# Patient Record
Sex: MALE | Race: WHITE | URBAN - METROPOLITAN AREA
[De-identification: names, ages, dates, MRNs, and addresses within clinical notes are randomized per-mention and may not be internally consistent; named-entity substitution may affect disease eponyms.]

---

## 2017-01-02 ENCOUNTER — HOSPITAL ENCOUNTER (OUTPATIENT)
Dept: BEHAVIORAL HEALTH | Facility: CLINIC | Age: 37
End: 2017-01-02
Attending: SOCIAL WORKER
Payer: COMMERCIAL

## 2017-01-02 PROCEDURE — H2035 A/D TX PROGRAM, PER HOUR: HCPCS | Mod: HQ

## 2017-01-03 ENCOUNTER — HOSPITAL ENCOUNTER (OUTPATIENT)
Dept: BEHAVIORAL HEALTH | Facility: CLINIC | Age: 37
End: 2017-01-03
Attending: SOCIAL WORKER
Payer: COMMERCIAL

## 2017-01-03 PROCEDURE — H2035 A/D TX PROGRAM, PER HOUR: HCPCS | Mod: HQ

## 2017-01-04 ENCOUNTER — HOSPITAL ENCOUNTER (OUTPATIENT)
Dept: BEHAVIORAL HEALTH | Facility: CLINIC | Age: 37
End: 2017-01-04
Attending: SOCIAL WORKER
Payer: COMMERCIAL

## 2017-01-04 PROCEDURE — H2035 A/D TX PROGRAM, PER HOUR: HCPCS | Mod: HQ

## 2017-01-05 NOTE — PROGRESS NOTES
Patient:  Brooks Dhaliwal            Adult CD Progress Note and Treatment Plan Review     Attendance  Monday = 2 hours of Phase 1 Group Therapy 01/02/2017 Tuesday = 2 hours of Phase 1 Group Therapy 01/03/2017 Wednesday = 2 hours of Phase 1 Group Therapy 01/04/2017   For a total of 10 sessions of Phase 1 completed to date.    Please refer to [facility] CD Adult Attendance Record Documentation Flowsheet    Support group attended this week: no    Reporting sobriety:  yes    Treatment Plan     Treatment Plan Review competed on: 01/05/2017      Client preferred learning style: Visual  Hands on    Staff Members contributing ALEJANDRO Busch  Received Supervision: No    Client: contributed to goals and plan.    Client received copy of plan/revised plan: Yes    Client agrees with plan/revised plan: Yes    Changes to Treatment Plan: No    New Goals added since last review: N/A    Goals worked on since last review: dim 5, dim 6, multiple assignments    Strategies effective: yes    Strategies need these changes: N/A    ASAM Risk Rating:    Dimension 1 0 Client reports last date of use of alcohol as 10/20/2016 and last date of use of marijuana as 12/10/2016. No current concerns.     Dimension 2 0 Client reports medical diagnosis. Client is able to obtain services and has a primary care provider. No current concerns.     Dimension 3 1 Client reports a mental health diagnosis of anxiety, depression and ADHD. Client reports he has a psychiatrist and therapist. Client reports he feels like his medication is working well (been on it for about 4 months now). Client appears stable. Client denies self harm. No other concerns at this time.     Dimension 4 1 Client reports his motivation for sobriety a 10 out of 10 (10 greatest). Client has external motivation through legals and relationship    Dimension 5 3 Client reports the following triggers: impatience and frustration. Client rates his cravings an 8 out of 10 (10 greatest). Client  "reports he is dealing with them by \"deep breathing\"    Dimension 6 2 Client is currently not working as he is seasonal. Client reports current pending legals (3rd DWI). Client denies sober support meetings.     Any changes in Vulnerable Adult Status?  No  If yes, add to treatment plan and individual abuse prevention plan.    Family Involvement:   scheduled family week 01/23 and 01/25    Data:   offered feedback client did actively participate   Client checked in with the group and confirmed his sobriety. Client rated his mental and physical health a 9 out of 10 (10) greatest. Client reports his move back into his house went well and is happy to be back home. Client reported some stress getting through the holiday as he spent it with his wife's family and celebrated [place], but was able to make it through. Client presented multiple assignments. Client reported his Usage History and stated he wasn't honest during the assessment but did the best he could to come up with a detailed history.     Intervention:   Behavior modification  Cognitive Behavioral Therapy  Counselor feedback  Education  Emotional management  Group feedback  Motivational Enhancement Therapy  Relapse prevention  Client presented multiple assignments.  Client participated in skills group - Ej torres group.   Client participated in a group activity about values.     Assessment:   Stages of Change Model  Contemplation    Appears/Sounds:  Cooperative  Motivated  Engaged  Client is engaged in group conversations, but is more reserved than some of the other group members. Client was honest about not being honest before, indicating internal motivation for change.      Plan:  Focus on recovery environment  Monitor emotional/physical health   Monitor anxiety/stress.  Present Cravings and Triggers next week 01/11/2016  Continue with individual therapist.    ALEJANDRO Busch        "

## 2017-01-09 ENCOUNTER — HOSPITAL ENCOUNTER (OUTPATIENT)
Dept: BEHAVIORAL HEALTH | Facility: CLINIC | Age: 37
End: 2017-01-09
Attending: SOCIAL WORKER
Payer: COMMERCIAL

## 2017-01-09 PROCEDURE — H2035 A/D TX PROGRAM, PER HOUR: HCPCS | Mod: HQ

## 2017-01-10 ENCOUNTER — HOSPITAL ENCOUNTER (OUTPATIENT)
Dept: BEHAVIORAL HEALTH | Facility: CLINIC | Age: 37
End: 2017-01-10
Attending: SOCIAL WORKER
Payer: COMMERCIAL

## 2017-01-10 PROCEDURE — H2035 A/D TX PROGRAM, PER HOUR: HCPCS | Mod: HQ

## 2017-01-11 ENCOUNTER — HOSPITAL ENCOUNTER (OUTPATIENT)
Dept: BEHAVIORAL HEALTH | Facility: CLINIC | Age: 37
End: 2017-01-11
Attending: SOCIAL WORKER
Payer: COMMERCIAL

## 2017-01-11 PROCEDURE — H2035 A/D TX PROGRAM, PER HOUR: HCPCS | Mod: HQ

## 2017-01-12 NOTE — PROGRESS NOTES
"Patient:  Brooks Dhaliwal            Adult CD Progress Note and Treatment Plan Review     Attendance  Monday = 2 hours of Phase 1 Group Therapy 01/09/2017 Tuesday = 2 hours of Phase 1 Group Therapy 01/10/2017  Wednesday = 2 hours of Phase 1 Group Therapy 01/11/2017   For a total of 13 sessions of Phase 1 completed to date.    Please refer to [facility] CD Adult Attendance Record Documentation Flowsheet    Support group attended this week: no    Reporting sobriety:  yes    Treatment Plan     Treatment Plan Review competed on: 01/12/2017      Client preferred learning style: Visual  Hands on    Staff Members contributing ALEJANDRO Busch  Received Supervision: No    Client: contributed to goals and plan.    Client received copy of plan/revised plan: Yes    Client agrees with plan/revised plan: Yes    Changes to Treatment Plan: No    New Goals added since last review: N/A    Goals worked on since last review: dim 5, dim 6, multiple assignments    Strategies effective: yes    Strategies need these changes: N/A    ASAM Risk Rating:    Dimension 1 0 Client reports last date of use of alcohol as 10/20/2016 and last date of use of marijuana as 12/10/2016. No current concerns.     Dimension 2 0 Client reports medical diagnosis. Client is able to obtain services and has a primary care provider. No current concerns.     Dimension 3 1 Client reports a mental health diagnosis of anxiety, depression and ADHD. Client reports he feels like his medication is working well (been on it for about 4 months now). Client appears stable. Client denies self harm. No other concerns at this time.     Dimension 4 1 Client reports his motivation for sobriety a 10 out of 10 (10 greatest). Client has external motivation through legals and relationship    Dimension 5 3 Client reports the following triggers: impatience and anxiety. Client rates his cravings an 8 out of 10 (10 greatest). Client reports he is dealing with them by \"deep breathing\"    " Dimension 6 2 Client is currently not working as he is seasonal. Client reports current pending legals (3rd DWI). Client denies sober support meetings.     Any changes in Vulnerable Adult Status?  No  If yes, add to treatment plan and individual abuse prevention plan.    Family Involvement:   scheduled family week 01/23 and 01/25    Data:   offered feedback client did actively participate   Client checked in with the group and confirmed his sobriety. Client rated his mental and physical health a 9 out of 10 (10) greatest. Client reports things are still going well at home. Client reports they are doing counseling together every other week. Client reports he spent the day snowboarding one day which he hasn't done in a long time. Client reports he didn't really do anything for fun when he was using. Client presented his What's Happening in My Early Recovery and Cravings and Triggers assignment. Client reported that he has a doctor and dentist apt coming up and he plans on telling his providers about his use.       Intervention:   Behavior modification  Cognitive Behavioral Therapy  Counselor feedback  Education  Emotional management  Group feedback  Motivational Enhancement Therapy  Relapse prevention  Client presented multiple assignments.  Client participated in skills group - Relapse Prevention.   Client participated in a group activity about stigma, shame and guilt.     Assessment:   Stages of Change Model  Contemplation    Appears/Sounds:  Cooperative  Motivated  Engaged  Client has opened up more this week. Client has been gaining insight in how his mental health played into his addiction. Client discussed still learning about his mental health because no one in his family ever talked about this growing up.      Plan:  Focus on recovery environment  Monitor emotional/physical health   Monitor anxiety/stress.  Present next assignment next week.  Continue with individual therapist.  Find meetings to attend/sober  support.    Oumou Ashraf [facility]

## 2017-01-16 ENCOUNTER — HOSPITAL ENCOUNTER (OUTPATIENT)
Dept: BEHAVIORAL HEALTH | Facility: CLINIC | Age: 37
End: 2017-01-16
Attending: SOCIAL WORKER
Payer: COMMERCIAL

## 2017-01-16 PROCEDURE — H2035 A/D TX PROGRAM, PER HOUR: HCPCS | Mod: HQ

## 2017-01-17 ENCOUNTER — HOSPITAL ENCOUNTER (OUTPATIENT)
Dept: BEHAVIORAL HEALTH | Facility: CLINIC | Age: 37
End: 2017-01-17
Attending: SOCIAL WORKER
Payer: COMMERCIAL

## 2017-01-17 PROCEDURE — H2035 A/D TX PROGRAM, PER HOUR: HCPCS | Mod: HQ

## 2017-01-18 ENCOUNTER — HOSPITAL ENCOUNTER (OUTPATIENT)
Dept: BEHAVIORAL HEALTH | Facility: CLINIC | Age: 37
End: 2017-01-18
Attending: SOCIAL WORKER
Payer: COMMERCIAL

## 2017-01-18 PROCEDURE — H2035 A/D TX PROGRAM, PER HOUR: HCPCS | Mod: HQ

## 2017-01-19 NOTE — PROGRESS NOTES
"Patient:  Brooks Dhaliwal            Adult CD Progress Note and Treatment Plan Review     Attendance Dates: 1/16/17, 1/17/17, 1/18/17    Total # of Group Sessions:  3        MONDAY TUESDAY WEDNESDAY THURSDAY FRIDAY SATURDAY SUNDAY  Total    Group Therapy   1  1  1             Specialty Groups*                    1:1                    Family Program                    [place]                      Phase II                     Absent                    Total   1  1  1          3                  For a total of 15 sessions of Phase 1 completed to date.    Please refer to [facility] CD Adult Attendance Record Documentation Flowsheet    Support group attended this week: no    Reporting sobriety:  yes    Treatment Plan     Treatment Plan Review competed on: 01/17/2017      Client preferred learning style: Visual  Hands on    Staff Members contributing Jocelyn Kehr Sparby, LADC  Received Supervision: No    Client: contributed to goals and plan.    Client received copy of plan/revised plan: Yes    Client agrees with plan/revised plan: Yes    Changes to Treatment Plan: No    New Goals added since last review: N/A    Goals worked on since last review: dim 5, dim 6    Strategies effective: yes    Strategies need these changes: N/A    ASAM Risk Rating:    Dimension 1 0 Client reports last date of use of alcohol as 10/20/2016 and last date of use of marijuana as 12/10/2016. No current concerns.     Dimension 2 0 Client reports medical diagnosis. Client is able to obtain services and has a primary care provider. No current concerns.     Dimension 3 1 Client reports a mental health diagnosis of anxiety, depression and ADHD. Client reports he feels like his medication is working well (been on it for about 4 months now).  Client denies any emotional symptoms.  He appears stable.    Dimension 4 1 Client continues to report his motivation for sobriety a 10 out of 10 (10 greatest).  Client identified reasons to stay sober as \"health, " "have better relationships, save money\".    Dimension 5 3 Client reports impatience is a trigger.  He reported experiencing cravings this week at an intensity of a 6 out of 10 (10=high). Client reports he is dealing with them with \"breathing techniques\".    Dimension 6 2 Client reports that budgeting is stressful for him.  He and his wife have planned a fishing trip in 2 weeks.    Any changes in Vulnerable Adult Status?  No  If yes, add to treatment plan and individual abuse prevention plan.    Family Involvement:   scheduled family week 01/23 and 01/25    Data:   offered feedback client did actively participate   Client checked in with the group and confirmed his sobriety. Client reported feeling a 9 out of 10.      Intervention:   Behavior modification  Cognitive Behavioral Therapy  Counselor feedback  Education  Emotional management  Group feedback  Relapse prevention     Client participated in identifying common cognitive distortions and their role in influencing negative emotions like stress and anxiety.  He practiced ways of alternative thinking.        Assessment:   Stages of Change Model  Contemplation    Appears/Sounds:  Cooperative  Motivated  Engaged        Plan:  Focus on recovery environment  Monitor emotional/physical health   Continue to monitor anxiety/stress and be mindful of thoughts and find ways to use alternative thinking to improve emotion.    Continue with individual therapist.  Client will attend family group with spouse next week    Jocelyn Kehr Sparby, LADC        "

## 2017-01-23 ENCOUNTER — HOSPITAL ENCOUNTER (OUTPATIENT)
Dept: BEHAVIORAL HEALTH | Facility: CLINIC | Age: 37
End: 2017-01-23
Attending: SOCIAL WORKER
Payer: COMMERCIAL

## 2017-01-23 PROCEDURE — H2035 A/D TX PROGRAM, PER HOUR: HCPCS | Mod: HQ

## 2017-01-24 ENCOUNTER — HOSPITAL ENCOUNTER (OUTPATIENT)
Dept: BEHAVIORAL HEALTH | Facility: CLINIC | Age: 37
End: 2017-01-24
Attending: SOCIAL WORKER
Payer: COMMERCIAL

## 2017-01-24 PROCEDURE — H2035 A/D TX PROGRAM, PER HOUR: HCPCS | Mod: HQ

## 2017-01-25 ENCOUNTER — HOSPITAL ENCOUNTER (OUTPATIENT)
Dept: BEHAVIORAL HEALTH | Facility: CLINIC | Age: 37
End: 2017-01-25
Attending: SOCIAL WORKER
Payer: COMMERCIAL

## 2017-01-25 PROCEDURE — H2035 A/D TX PROGRAM, PER HOUR: HCPCS | Mod: HQ

## 2017-01-26 NOTE — PROGRESS NOTES
"Patient:  Brooks Dhaliwal            Adult CD Progress Note and Treatment Plan Review     Attendance Dates: 01/23/2017, 01/24/2017, 01/25/2017    Total # of Group Sessions:  3        MONDAY TUESDAY WEDNESDAY THURSDAY FRIDAY SATURDAY SUNDAY  Total    Group Therapy   1    1             Specialty Groups*     1               1:1                    Family Program                    [place]                      Phase II                     Absent                    Total   1  1  1          3              For a total of 19 sessions of Phase 1 completed to date.    Please refer to [facility] CD Adult Attendance Record Documentation Flowsheet    Support group attended this week: no    Reporting sobriety:  yes    Treatment Plan     Treatment Plan Review competed on: 01/26/2017      Client preferred learning style: Visual  Hands on    Staff Members contributing ALEJANDRO Busch  Received Supervision: No    Client: contributed to goals and plan.    Client received copy of plan/revised plan: Yes    Client agrees with plan/revised plan: Yes    Changes to Treatment Plan: No    New Goals added since last review: N/A    Goals worked on since last review: dim 3, dim 5, dim 6    Strategies effective: yes    Strategies need these changes: N/A    ASAM Risk Rating:    Dimension 1 0 Client reports last date of use of alcohol as 10/20/2016 and last date of use of marijuana as 12/10/2016. No current concerns.     Dimension 2 0 Client reports medical diagnosis. Client is able to obtain services and has a primary care provider. No current concerns.     Dimension 3 1 Client reports a mental health diagnosis of anxiety, depression and ADHD. Client denies any emotional changes. Client denies self harm.  He appears stable.    Dimension 4 1 Client continues to report his motivation for sobriety a 10 out of 10 (10 greatest).  Client identified reasons to stay sober as \"healthy relationships, mental health, save money\".    Dimension 5 3 Client " "reports frustration and others as triggers.  He reported experiencing cravings this week at an intensity of a 6 out of 10 (10=high). Client reports he is dealing with them with \"breathing and staying busy\"    Dimension 6 2 Client reports that it's still an adjustment living at home. Reports they are still doing therapy. Client reports him and his wife are going ice fishing this weekend.     Any changes in Vulnerable Adult Status?  No  If yes, add to treatment plan and individual abuse prevention plan.    Family Involvement:   scheduled family week 01/23 and 01/25 - rescheduled     Data:   offered feedback client did actively participate   Client checked in with the group and confirmed his sobriety. Client reported feeling a 10 out of 10.  Client reports he got a project done around the house which he was happy about. Client discussed a stressful few days at home and having to take 3 dogs to the vet, which was a stressful situation. Client reported him and his wife are still going to counseling and discussing the issues.  Client discussed 5 ways his addiction impacted his mental health and took a lot of time and thought into it. Client described the 5 ways and gave examples of each. Client also shared what he took away from [employer].     Intervention:   Behavior modification  Cognitive Behavioral Therapy  Counselor feedback  Education  Emotional management  Group feedback  Relapse prevention     Client participated in a group activity about stages of change. We discussed how change works and how change doesn't come from just stopping using, but all the other work that goes into it. Client discussed his mental health and how that needed to change for him to be successful.     Assessment:   Stages of Change Model  Preparation/Determination    Appears/Sounds:  Cooperative  Motivated  Engaged  Client appears ready to move into phase 2 next week. Client appears genuine in wanting to change from old behaviors. "     Plan:  Focus on recovery environment  Monitor emotional/physical health   Continue to monitor anxiety/stress and be mindful of thoughts and find ways to use alternative thinking to improve emotion.  Continue with individual therapist.  Continue counseling with wife.  Begin Phase 2 next week 01/31/2017    ALEJANDRO Busch

## 2017-01-31 ENCOUNTER — HOSPITAL ENCOUNTER (OUTPATIENT)
Dept: BEHAVIORAL HEALTH | Facility: CLINIC | Age: 37
End: 2017-01-31
Attending: SOCIAL WORKER
Payer: COMMERCIAL

## 2017-01-31 PROCEDURE — H2035 A/D TX PROGRAM, PER HOUR: HCPCS | Mod: HQ

## 2017-02-02 NOTE — PROGRESS NOTES
"Patient:  Brooks Dhaliwal            Adult CD Progress Note and Treatment Plan Review     Attendance Dates: 01/31/2017    Total # of Group Sessions:  1        MONDAY TUESDAY WEDNESDAY THURSDAY FRIDAY SATURDAY SUNDAY  Total    Group Therapy                    Specialty Groups*                   1:1                    Family Program                    [place]                      Phase II      1               Absent                    Total     1                         Please refer to OP BE[facility] CD Adult Attendance Record Documentation Flowsheet    Support group attended this week: no    Reporting sobriety:  yes    Treatment Plan     Treatment Plan Review competed on: 02/01/2017      Client preferred learning style: Visual  Hands on    Staff Members contributing ALEJANDRO Busch  Received Supervision: No    Client: contributed to goals and plan.    Client received copy of plan/revised plan: Yes    Client agrees with plan/revised plan: Yes    Changes to Treatment Plan: No    New Goals added since last review: N/A    Goals worked on since last review: completed Phase 1, moved into Phase 2    Strategies effective: yes    Strategies need these changes: N/A    ASAM Risk Rating:    Dimension 1 0 Client reports last date of use of alcohol as 10/20/2016 and last date of use of marijuana as 12/10/2016. No change.     Dimension 2 0 Client reports medical diagnosis. Client is able to obtain services and has a primary care provider. No change.    Dimension 3 1 Client reports a mental health diagnosis of anxiety, depression and ADHD. Client denies any emotional changes. Client denies self harm.  He appears stable.    Dimension 4 1 Client continues to report his motivation for sobriety an 8 out of 10 (10 greatest).  Client identified reasons to stay sober as \"better relationships, health, self respect\".    Dimension 5 3 Client reports depression as trigger this week.  He reported experiencing cravings this week at an " "intensity of a 6 out of 10 (10 greatest). Client reports he is dealing with them with  \"staying busy\".    Dimension 6 2 Client reports that it's still an adjustment living at home. Reports they are still doing therapy. Client reports his fishing trip was more of a trigger than he thought it would be.     Any changes in Vulnerable Adult Status?  No  If yes, add to treatment plan and individual abuse prevention plan.    Family Involvement:   scheduled family week 01/23 and 01/25 - rescheduled     Data:   offered feedback client did actively participate   Client checked in with his new group and confirmed his sobriety. Client reported feeling a 9 out of 10 (10 greatest). Client reports he had a good fishing trip but realized it was more of a trigger than he anticipated. Client reports he went with his wife who is supportive of his sobriety. Client discussed his goals and Planning Aftercare and said he would succeed by continuing individual and couples therapy and continuing to take his medications.      Intervention:   Aftercare planning  Behavior modification  Cognitive Behavioral Therapy  Counselor feedback  Emotional management  Group feedback  Relapse prevention     Client received his Phase 2 assignments.  Client began Phase 2.  Client discussed his Planning Aftercare assignment with the group.      Assessment:   Stages of Change Model  Preparation/Determination    Appears/Sounds:  Cooperative  Motivated  Engaged  Client continues to use staying busy as a way to deal with triggers. Client would benefit from using more coping skills, like discussed in group. Client does continue to attend therapy and couples therapy so he is well aware of many coping skills.     Plan:  Focus on recovery environment  Monitor emotional/physical health   Continue to monitor anxiety/stress and be mindful of thoughts and find ways to use alternative thinking to improve emotion.  Continue with individual therapist.  Continue counseling " with wife.  Find hobbies/explore activities to do to keep busy but have fun.     Oumou Ashraf,LADC

## 2017-02-14 ENCOUNTER — HOSPITAL ENCOUNTER (OUTPATIENT)
Dept: BEHAVIORAL HEALTH | Facility: CLINIC | Age: 37
End: 2017-02-14
Attending: SOCIAL WORKER
Payer: COMMERCIAL

## 2017-02-14 PROCEDURE — H2035 A/D TX PROGRAM, PER HOUR: HCPCS | Mod: HQ

## 2017-02-15 NOTE — PROGRESS NOTES
"Patient:  Brooks Dhaliwal            Adult CD Progress Note and Treatment Plan Review     Attendance Dates: 02/14/2017    Total # of Group Sessions:  2        MONDAY TUESDAY WEDNESDAY THURSDAY FRIDAY SATURDAY SUNDAY  Total    Group Therapy                    Specialty Groups*                   1:1                    Family Program                    [place]                      Phase II      1               Absent                    Total     1                         Please refer to OP BE[facility] CD Adult Attendance Record Documentation Flowsheet    Support group attended this week: no    Reporting sobriety:  yes    Treatment Plan     Treatment Plan Review competed on: 02/15/2017      Client preferred learning style: Visual  Hands on    Staff Members contributing Oumou Ashraf, JUSTINOC; ELIZA Nickerson, Intern  Received Supervision: No    Client: contributed to goals and plan.    Client received copy of plan/revised plan: Yes    Client agrees with plan/revised plan: Yes    Changes to Treatment Plan: No    New Goals added since last review: N/A    Goals worked on since last review: dim 5 and dim 6    Strategies effective: yes    Strategies need these changes: N/A    ASAM Risk Rating:    Dimension 1 0 Client reports last date of use of alcohol as 10/20/2016 and last date of use of marijuana as 12/10/2016. No change.     Dimension 2 0 Client reports medical diagnosis. Client is able to obtain services and has a primary care provider. No change.    Dimension 3 1 Client reports no change in mental health status or diagnosis.  Client denies any emotional changes. Client denies self harm.  He appears stable.    Dimension 4 1 Client continues to report his motivation for sobriety an 8 out of 10 (10 greatest).  Client identified reasons to stay sober as \"better relationships, better quality of life, and ability to do job better.\" Client reports he has a court on Thursday 2/16/17 regarding 3rd DUI.    Dimension 5 3 Client reports " "frustration as trigger this week due to work related job travel to \"[facility], over the weekend\".  He reported experiencing cravings this week at an intensity of a 6 out of 10 (10 greatest). Client reports he is dealing with them by doing  \"deep breathing\" exercise.    Dimension 6 2 Client reports that it's still an adjustment to living at home. Reports they are still doing marital therapy. Client reports he hopes to find a balance in his long work hours, and using more Assertive communication on both the \"home and job front, to manage frustration\".  Client denies a sponsor at this time. No other concerns at this time.    Any changes in Vulnerable Adult Status?  No  If yes, add to treatment plan and individual abuse prevention plan.    Family Involvement:   scheduled family week 01/23 and 01/25 - rescheduled     Data:   offered feedback client did actively participate   Client checked in with the group and confirmed his sobriety. Client reported feeling a 8 out of 10 (10 greatest).  Client reports his wife  is supportive of his sobriety. Client discussed how his mental health has improved with sobriety. Client reported his medications are working better and is able to process his emotions. Client has discussed in the past his anxiety was out of control, which is a reason he was using substances.     Intervention:   Aftercare planning  Behavior modification  Cognitive Behavioral Therapy  Counselor feedback  Emotional management  Group feedback  Relapse prevention     Client worked on Page 6/7 on his RC Plan. Client discussed with the group being assertive and his mental health changes.     Assessment:   Stages of Change Model  Preparation/Determination    Appears/Sounds:  Cooperative  Motivated  Engaged     Client participated in group by discussing his way of communicating better.  Client continues to use \"staying busy\" as a way to deal with triggers. Client reports using reducing his stress by using \"deep breathing " "exercise, like discussed in group. \"  Client does continue to attend individual therapy and couples therapy so he is well aware of many coping skills.     Plan:  Focus on recovery environment  Monitor emotional/physical health   Continue to monitor anxiety/stress and be mindful of thoughts and find ways to use alternative thinking to improve emotion.  Continue with individual therapist.  Continue counseling with wife.  Find hobbies/explore activities to do to keep busy but have fun.     Oumou Ashraf, [facility] Nickerson, Intern      "

## 2017-02-21 ENCOUNTER — HOSPITAL ENCOUNTER (OUTPATIENT)
Dept: BEHAVIORAL HEALTH | Facility: CLINIC | Age: 37
End: 2017-02-21
Attending: SOCIAL WORKER
Payer: COMMERCIAL

## 2017-02-21 PROCEDURE — H2035 A/D TX PROGRAM, PER HOUR: HCPCS | Mod: HQ

## 2017-02-22 NOTE — PROGRESS NOTES
"Patient:  Brooks Dhaliwal            Adult CD Progress Note and Treatment Plan Review     Attendance Dates: 02/21/2017    Total # of Phase 2 Group Sessions:  2  Total # of Group sessions this week: 1 MONDAY TUESDAY WEDNESDAY THURSDAY FRIDAY SATURDAY SUNDAY  Total    Group Therapy                    Specialty Groups*                   1:1                    Family Program                    [place]                      Phase II      1               Absent                    Total     1                         Please refer to OP BEH CD Adult Attendance Record Documentation Flowsheet    Support group attended this week: no    Reporting sobriety:  yes    Treatment Plan     Treatment Plan Review competed on: 02/22/2017      Client preferred learning style: Visual  Hands on    Staff Members contributing ALEJANDRO Busch  Received Supervision: No    Client: contributed to goals and plan.    Client received copy of plan/revised plan: Yes    Client agrees with plan/revised plan: Yes    Changes to Treatment Plan: No    New Goals added since last review: N/A    Goals worked on since last review: dim 5 and dim 6, court    Strategies effective: yes    Strategies need these changes: N/A    ASAM Risk Rating:    Dimension 1 0 Client reports last date of use of alcohol as 10/20/2016 and last date of use of marijuana as 12/10/2016. No change.     Dimension 2 0 Client reports medical diagnosis. Client is able to obtain services and has a primary care provider. No change.    Dimension 3 1 Client reports no change in mental health status or diagnosis.  Client denies any emotional changes. Client denies self harm.  He appears stable.    Dimension 4 1 Client continues to report his motivation for sobriety an 8 out of 10 (10 greatest).  Client identified reasons to stay sober as \"better relationships, better quality of life, and ability to do job better.\"    Dimension 5 2 Client reports others as trigger this week.  He " "reported experiencing cravings this week at an intensity of a 7 out of 10 (10 greatest). Client reports he is dealing with them by doing  \"deep breathing\" exercise.    Dimension 6 2 Client reports court went better than he expected. Client reports being happy about that. Client reports he is starting to work a little more each week. Client discussed how he needs to delegate better and pass on tasks so he doesn't take on so much work. Client reported he needs to starts trusting others at work to get the job done.     Any changes in Vulnerable Adult Status?  No  If yes, add to treatment plan and individual abuse prevention plan.    Family Involvement:   scheduled family week 01/23 and 01/25 - rescheduled     Data:   offered feedback client did actively participate   Client checked in with the group and confirmed his sobriety. Client reported feeling a 8 out of 10 (10 greatest).  Client dicussed changes he needs to make to stop repeating mistakes of the past and present. Client reported delegating more at work. When has how he would do that, client reported trusting others to be responsible. Client discussed how his mental and physical health have become a lot better, and doesn't want to go backwards in his recovery.     Intervention:   Aftercare planning  Behavior modification  Cognitive Behavioral Therapy  Counselor feedback  Emotional management  Group feedback  Relapse prevention     Client worked on 5 things he needs to change when leaving treatment.   Client gave encouraging feedback to another group member completing treatment.      Assessment:   Stages of Change Model  Preparation/Determination    Appears/Sounds:  Cooperative  Motivated  Engaged     Client appears he is able to see how sobriety has improved his mental and physical health. Client appears to want to make these changes for himself, as he is continuing with individual and marriage counseling.     Plan:  Focus on recovery environment  Monitor " emotional/physical health   Continue to monitor anxiety/stress and be mindful of thoughts and find ways to use alternative thinking to improve emotion.  Continue with individual therapist.  Continue counseling with wife.  Find hobbies/explore activities to do to keep busy but have fun.   Delegate responsibilities at work.     Oumou Ashraf, ALEJANDRO

## 2017-02-28 ENCOUNTER — HOSPITAL ENCOUNTER (OUTPATIENT)
Dept: BEHAVIORAL HEALTH | Facility: CLINIC | Age: 37
End: 2017-02-28
Attending: SOCIAL WORKER
Payer: COMMERCIAL

## 2017-03-01 NOTE — PROGRESS NOTES
"Patient:  Brooks Dhaliwal            Adult CD Progress Note and Treatment Plan Review     Attendance Dates: 02/28/2017    Total # of Phase 2 Group Sessions:  4  Total # of Group sessions this week: 1 MONDAY TUESDAY WEDNESDAY THURSDAY FRIDAY SATURDAY SUNDAY  Total    Group Therapy                    Specialty Groups*                   1:1                    Family Program                    [place]                      Phase II      1               Absent                    Total     1                         Please refer to OP BE[facility] CD Adult Attendance Record Documentation Flowsheet    Support group attended this week: no    Reporting sobriety:  yes    Treatment Plan     Treatment Plan Review competed on: 03/01/2017      Client preferred learning style: Visual  Hands on    Staff Members contributing ALEJANDRO Busch Intern  Received Supervision: No    Client: contributed to goals and plan.    Client received copy of plan/revised plan: Yes    Client agrees with plan/revised plan: Yes    Changes to Treatment Plan: No    New Goals added since last review: N/A    Goals worked on since last review: dim 5 and dim 6, work    Strategies effective: yes    Strategies need these changes: N/A    ASAM Risk Rating:    Dimension 1 0 Client reports last date of use of alcohol as 10/20/2016 and last date of use of marijuana as 12/10/2016. No change.     Dimension 2 0 Client reports no current medical diagnosis. No current concerns.     Dimension 3 1 Client reports no change in mental health status or diagnosis.  Client denies any emotional changes. Client denies self harm.  He appears stable.    Dimension 4 1 Client continues to report his motivation for sobriety an 8 out of 10 (10 greatest).  Client identified reasons to stay sober as \"better relationships, health, and being able to get more accomplished.\" Client appears in the preparation stage of change, by addressing his mental health and reasons for " "sobriety.    Dimension 5 2 Client reports frustration and impatience as trigger this week.  He reported experiencing cravings this week at an intensity of a 6 out of 10 (10 greatest). Client reports he is dealing with them by doing  \"deep breathing exercise, and staying busy.\" Client reports he is busy getting ready to return to work.     Dimension 6 2 Client reports he has been under stress about work over the last week, as he will be starting back working. Client discussed how he needs to delegate better and pass on tasks so he doesn't take on so much work. Client reported he needs to starts trusting others at work to get the job done.     Any changes in Vulnerable Adult Status?  No  If yes, add to treatment plan and individual abuse prevention plan.    Family Involvement:   scheduled family week 01/23 and 01/25 - rescheduled     Data:   offered feedback client did actively participate   Client checked in with the group and confirmed his sobriety. Client reported feeling a 8 out of 10 (10 greatest). Client participated in working on his Recovery Care packet with the group and discussed being aware of warning triggers and developing coping skills. Client shared with the group that he went to a party for his friend birthday and that no one even realized he wasn't drinking. Client reported that  he actually felt good when it was time to leave, as he was able to drive himself home.      Intervention:   Aftercare planning  Behavior modification  Cognitive Behavioral Therapy  Counselor feedback  Emotional management  Group feedback  Relapse prevention   Client worked on addressing what coping skills he uses for his mental health and addiction issues.  Client gave encouraging feedback to another group member completing treatment.      Assessment:   Stages of Change Model  Preparation/Determination    Appears/Sounds:  Cooperative  Motivated  Engaged     Client appears he is able to see how sobriety has improved his mental " and physical health. Client appears to want to make these changes for himself, as he is continuing with individual and marriage counseling. Client is more reserved during group, but has seemed to connect with a few of the group members.     Plan:  Focus on recovery environment  Monitor emotional/physical health   Continue to monitor anxiety/stress and be mindful of thoughts and find ways to use alternative thinking to improve emotion.  Continue with individual therapist.  Continue counseling with wife.  Find hobbies/explore activities to do to keep busy but have fun.   Delegate responsibilities at work.     Oumou Ashraf, ALEJANDRO Nickerson, Intern

## 2017-03-07 ENCOUNTER — HOSPITAL ENCOUNTER (OUTPATIENT)
Dept: BEHAVIORAL HEALTH | Facility: CLINIC | Age: 37
End: 2017-03-07
Attending: SOCIAL WORKER
Payer: COMMERCIAL

## 2017-03-07 PROCEDURE — H2035 A/D TX PROGRAM, PER HOUR: HCPCS | Mod: HQ

## 2017-03-08 NOTE — PROGRESS NOTES
"Patient:  Brooks Dhaliwal            Adult CD Progress Note and Treatment Plan Review     Attendance Dates: 03/7/2017    Total # of Phase 2 Group Sessions:  5  Total # of Group sessions this week: 1 MONDAY TUESDAY WEDNESDAY THURSDAY FRIDAY SATURDAY SUNDAY  Total    Group Therapy                    Specialty Groups*                   1:1                    Family Program                    [place]                      Phase II      1               Absent                    Total     1                         Please refer to OP BEH CD Adult Attendance Record Documentation Flowsheet    Support group attended this week: no    Reporting sobriety:  yes    Treatment Plan     Treatment Plan Review competed on: 03/08/2017      Client preferred learning style: Visual  Hands on    Staff Members contributing ALEJANDRO Busch Intern  Received Supervision: No    Client: contributed to goals and plan.    Client received copy of plan/revised plan: Yes    Client agrees with plan/revised plan: Yes    Changes to Treatment Plan: No    New Goals added since last review: N/A    Goals worked on since last review: dim 5 and dim 6    Strategies effective: yes    Strategies need these changes: N/A    ASAM Risk Rating:    Dimension 1 0 Client reports last date of use of alcohol as 10/20/2016 and last date of use of marijuana as 12/10/2016. No change.      Dimension 2 0 Client reports no current medical diagnosis. No current concerns.     Dimension 3 1 Client reports no change in mental health status or diagnosis.  Client denies any emotional changes. Client denies self harm.  He appears stable.    Dimension 4 1 Client continues to report his motivation for sobriety an 9 out of 10 (10 greatest).  Client identified reasons to stay sober as \"mental health, staying out of trouble, better relationships\". Client appears in the preparation stage of change, by addressing his mental health and reasons for sobriety.    " "Dimension 5 2 Client reports others as trigger this week.  He reported experiencing cravings this week at an intensity of a 5 out of 10 (10 greatest). Client reports he is dealing with them by doing  \"deep breathing exercise, and staying busy.\" Client reports his license has been revoked for 1 year, and that he is currently waiting to have interlock placed in his vehicle.     Dimension 6 2 Client reports he has been under stress at work, and also while trying to sell his home. Client reports he and his wife are planning a last minute trip to [place] over the weekend, to celebrate their anniversary. Client denies sober support meetings. Client denies a sponsor.     Any changes in Vulnerable Adult Status?  No  If yes, add to treatment plan and individual abuse prevention plan.    Family Involvement:   scheduled family week 01/23 and 01/25 - rescheduled     Data:   offered feedback client did actively participate   Client checked in with the group and confirmed his sobriety. Client reported feeling an 8 out of 10 (10 greatest). Client participated in discussion with the group on ways to support sobriety, recognizing warning triggers and developing coping skills. Client connected with other group members discussing mental health, anxiety symptoms. Client also provided feedback about housing to another client.     Intervention:   Aftercare planning  Counselor feedback  Emotional management  Group feedback  Relapse prevention     Client worked on addressing what coping skills he uses for his mental health and addiction issues.    Assessment:   Stages of Change Model  Preparation/Determination    Appears/Sounds:  Cooperative  Motivated  Engaged     Client appears he is able to see how sobriety has improved his mental and physical health. Client appears to want to make these changes for himself, as he is continuing with individual and marriage counseling. Client's legal issues have been resolved and is taking the next steps " needed to get his licence back, which is positive.     Plan:  Focus on recovery environment  Monitor emotional/physical health   Continue to monitor anxiety/stress and be mindful of thoughts and find ways to use alternative thinking to improve emotion.  Continue with individual therapist.  Continue counseling with wife.  Delegate responsibilities at work.     ALEJANDRO Busch, Intern

## 2017-03-14 ENCOUNTER — HOSPITAL ENCOUNTER (OUTPATIENT)
Dept: BEHAVIORAL HEALTH | Facility: CLINIC | Age: 37
End: 2017-03-14
Attending: SOCIAL WORKER
Payer: COMMERCIAL

## 2017-03-14 PROCEDURE — H2035 A/D TX PROGRAM, PER HOUR: HCPCS | Mod: HQ

## 2017-03-15 NOTE — PROGRESS NOTES
"Patient:  Brooks Dhaliwal            Adult CD Progress Note and Treatment Plan Review     Attendance Dates: 03/14/2017    Total # of Phase 2 Group Sessions:  6  Total # of Group sessions this week: 1 MONDAY TUESDAY WEDNESDAY THURSDAY FRIDAY SATURDAY SUNDAY  Total    Group Therapy                    Specialty Groups*                   1:1                    Family Program                    [place]                      Phase II      1               Absent                    Total     1                         Please refer to OP BEH CD Adult Attendance Record Documentation Flowsheet    Support group attended this week: no    Reporting sobriety:  yes    Treatment Plan     Treatment Plan Review competed on: 03/15/2017      Client preferred learning style: Visual  Hands on    Staff Members contributing ALEJANDRO Busch ;  ELIZA Nickerson, Intern  Received Supervision: No    Client: contributed to goals and plan.    Client received copy of plan/revised plan: Yes    Client agrees with plan/revised plan: Yes    Changes to Treatment Plan: No    New Goals added since last review: N/A    Goals worked on since last review: vacation, getting DL back    Strategies effective: yes    Strategies need these changes: N/A    ASAM Risk Rating:    Dimension 1 0 Client reports last date of use of alcohol as 10/20/2016 and last date of use of marijuana as 12/10/2016. No change.      Dimension 2 0 Client reports no current medical diagnosis. No current concerns.     Dimension 3 1 Client reports no change in mental health status or diagnosis.  Client denies any emotional changes. Client denies self harm.  He appears stable.    Dimension 4 1 Client continues to report his motivation for sobriety an 9 out of 10 (10 greatest).  Client identified reasons to stay sober as \"mental health, staying out of trouble, to better deal with problems\". Client appears in the preparation stage of change, by addressing his mental health and reasons " "for sobriety.    Dimension 5 2 Client reports frustration and others as triggers this week.  He reported experiencing cravings this week at an intensity of a 5 out of 10 (10 greatest). Client reports he is dealing with them by doing  \"deep breathing exercise, and staying busy.\" Client states he has been more active in maintaining his health, self respect, and dignity.   Dimension 6 2  Client reports he and his wife had a fun vacation in [place] over the weekend, celebrating their anniversary. Client denies sober support meetings. Client denies a sponsor. Client reports his license has been revoked for 1 year, and that he is currently waiting to have interlock placed in his vehicle. Client states he wants to start driving again, and get his license back.    Any changes in Vulnerable Adult Status?  No  If yes, add to treatment plan and individual abuse prevention plan.    Family Involvement:   scheduled family week 01/23 and 01/25 - rescheduled     Data:   offered feedback client did actively participate  Client checked in with group feeling 9 of 10 (10 greatest) and confirmed his sobriety . Client participated in group Personal Recovery Care Plan packet assignment, Crisis Management. Client shared with group he will addresses crisis', by remembering how life is much worse when using and talking with friends depending on what's going on. Client states he is \"normally calm, and manges things pretty well\".     Intervention:   Aftercare planning  Counselor feedback  Emotional management  Group feedback  Relapse prevention   Client worked on Recovery Care Packet with the group.     Assessment:   Stages of Change Model  Preparation/Determination    Appears/Sounds:  Cooperative  Motivated  Engaged     Client appears to see how sobriety has improved his mental and physical health. Client appears to want to make these changes for himself, as he is continuing with individual and marriage counseling. Client's legal issues have been " resolved and is taking the next steps needed to get his licence back, which is positive.     Plan:  Focus on recovery environment  Monitor emotional/physical health   Continue to monitor anxiety/stress and be mindful of thoughts and find ways to use alternative thinking to improve emotion.  Continue with individual therapist.  Continue counseling with wife.  Delegate responsibilities at work.     ALEJANDRO Busch, Intern

## 2017-03-21 ENCOUNTER — TELEPHONE (OUTPATIENT)
Dept: BEHAVIORAL HEALTH | Facility: CLINIC | Age: 37
End: 2017-03-21

## 2017-03-21 NOTE — TELEPHONE ENCOUNTER
3/21/2017    Client left a VM on 03/20/2017 at 8:37pm stating he won't be in group on 03/21/2017 due to needing to attend his wife's family members wake.     Oumou Ashraf, JUSTINOC

## 2017-03-28 ENCOUNTER — HOSPITAL ENCOUNTER (OUTPATIENT)
Dept: BEHAVIORAL HEALTH | Facility: CLINIC | Age: 37
End: 2017-03-28
Attending: SOCIAL WORKER
Payer: COMMERCIAL

## 2017-03-28 PROCEDURE — H2035 A/D TX PROGRAM, PER HOUR: HCPCS | Mod: HQ

## 2017-03-29 NOTE — PROGRESS NOTES
"Patient:  Brooks Dhaliwal            Adult CD Progress Note and Treatment Plan Review     Attendance Dates: 03/28/2017    Total # of Phase 2 Group Sessions:  7  Total # of Group sessions this week: 1 MONDAY TUESDAY WEDNESDAY THURSDAY FRIDAY SATURDAY SUNDAY  Total    Group Therapy                    Specialty Groups*                   1:1                    Family Program                    [place]                      Phase II      1               Absent                    Total     1                         Please refer to OP [facility] CD Adult Attendance Record Documentation Flowsheet    Support group attended this week: no    Reporting sobriety:  yes    Treatment Plan     Treatment Plan Review competed on: 03/29/2017      Client preferred learning style: Visual  Hands on    Staff Members contributing ALEJANDRO Busch;  ELIZA Nickerson, Intern  Received Supervision: No    Client: contributed to goals and plan.    Client received copy of plan/revised plan: Yes    Client agrees with plan/revised plan: Yes    Changes to Treatment Plan: No    New Goals added since last review: N/A    Goals worked on since last review: went back to work    Strategies effective: yes    Strategies need these changes: N/A    ASAM Risk Rating:    Dimension 1 0 Client reports last date of use of alcohol as 10/20/2016 and last date of use of marijuana as 12/10/2016. Client reports no withdrawal symptoms at this time. No change.        Dimension 2 0 Client reports no current medical diagnosis. No current concerns.       Dimension 3 1 Client reports no change in mental health status or diagnosis.  Client reports feeling of anxiety and rating at 5 out of 10 (highest). Client reports he was able to cope with feeling of anxiety by \"deep breathing\". Client states he is feeling tired and that he is working on get sleeping schedule on track for new early morning hours at work.  Client denies self harm.  He appears stable.      Dimension 4 " "Client identified values he focused on for moving his life forward in the past week were honesty and work ethic.  Client continues to appear in the preparation stage of change, by addressing his mental health and reasons for sobriety.      Dimension 5 2 Client reports starting work for the season as triggers this week.  He reported experiencing cravings this week at an intensity of 4 out of 10 (10 greatest). Client reports starting work for the season caused triggers, and that he \"stayed calm and focused.\"       Dimension 6 2  Client rated his living situation a 2 out of 10 (very stressful).  Client reports his \"wife is very supportive, but also can cause stress\". Client reports he will build sober networks in the coming week by \"telling people that I am sober, as I start seeing people at work.\"  Client denies sober support meetings. Client denies a sponsor.     Any changes in Vulnerable Adult Status?  No  If yes, add to treatment plan and individual abuse prevention plan.    Family Involvement:   scheduled family week 01/23 and 01/25 - rescheduled     Data:   offered feedback client did actively participate     Client checked in with group feeling 9 of 10 (10 greatest) and confirmed his sobriety . Client participated in group Personal Recovery Care Plan packet, and discussed values and how substance use interfered with his ability to live a Value directed life. Client shared with group, values that are important to him is \"integrity\".       Intervention:   Aftercare planning  Counselor feedback  Emotional management  Group feedback  Relapse prevention   Client worked on Recovery Care Packet with the group.     Assessment:   Stages of Change Model  Preparation/Determination    Appears/Sounds:  Cooperative  Motivated  Engaged     Client appears to see how sobriety has improved his mental and physical health. Client appears to want to make these changes for himself, as he is continuing with individual and marriage " counseling.     Plan:  Focus on recovery environment  Monitor emotional/physical health   Continue to monitor anxiety/stress and be mindful of thoughts and find ways to use alternative thinking to improve emotion.   Continue with individual therapist.  Continue counseling with wife.  Monitor self care with returning to work  Work on RCP for next week.    Oumou Ashraf, ALEJANDRO Nickerson, Intern

## 2017-04-04 ENCOUNTER — HOSPITAL ENCOUNTER (OUTPATIENT)
Dept: BEHAVIORAL HEALTH | Facility: CLINIC | Age: 37
End: 2017-04-04
Attending: SOCIAL WORKER
Payer: COMMERCIAL

## 2017-04-04 PROCEDURE — H2035 A/D TX PROGRAM, PER HOUR: HCPCS | Mod: HQ

## 2017-04-05 NOTE — PROGRESS NOTES
"Patient:  Brooks Dhaliwal            Adult CD Progress Note and Treatment Plan Review     Attendance Dates: 04/04/2017    Total # of Phase 2 Group Sessions:  8  Total # of Group sessions this week: 1        MONDAY TUESDAY WEDNESDAY THURSDAY FRIDAY SATURDAY SUNDAY  Total    Group Therapy                    Specialty Groups*                   1:1                    Family Program                    [place]                      Phase II      1.5            1.5   Absent                    Total     1            1.5             Please refer to OP BEH CD Adult Attendance Record Documentation Flowsheet    Support group attended this week: no    Reporting sobriety:  yes    Treatment Plan     Treatment Plan Review competed on: 04/05/2017      Client preferred learning style: Visual  Hands on    Staff Members contributing ALEJANDRO Busch;  ELIZA Nickerson, Intern  Received Supervision: No    Client: contributed to goals and plan.    Client received copy of plan/revised plan: Yes    Client agrees with plan/revised plan: Yes    Changes to Treatment Plan: No    New Goals added since last review: N/A    Goals worked on since last review: went back to work    Strategies effective: yes    Strategies need these changes: N/A    ASAM Risk Rating:    Dimension 1 0 Client reports last date of use of alcohol as 10/20/2016 and last date of use of marijuana as 12/10/2016. Client reports no withdrawal symptoms at this time. No change.        Dimension 2 0 Client reports no current medical diagnosis. Client is able to obtain medical services as needed. No current concerns.       Dimension 3 1 Client reports no change in mental health status or diagnosis.  Client reports feeling of anxiety and irritability.  Client reports he was able to cope with feelings by  \"deep breathing\".  Client denies self harm.  He appears stable.      Dimension 4 Client identified values he focused on for moving his life forward in the past week were " "integrity.  Client continues to appear in the preparation stage of change, by addressing his mental health and reasons for sobriety.      Dimension 5 2 Client states his triggers of work stress. Client states  he will work on delegating tasks at work and \"leaving work at work and not bringing it home with me.\"  He reported experiencing cravings this week at an intensity of 4 out of 10 (10 greatest).       Dimension 6 2  Client rated his living situation a 2 out of 10 (very stressful).  Client reports his \"wife is very supportive, but also can cause stress\". Client reports he will build sober networks in the coming week by \"continuing talking to people that I see at work.\"  Client denies sober support meetings. Client denies a sponsor.     Any changes in Vulnerable Adult Status?  No  If yes, add to treatment plan and individual abuse prevention plan.    Family Involvement:   scheduled family week 01/23 and 01/25 - rescheduled     Data:   offered feedback client did actively participate     Client checked in with group feeling 8 of 10 (10 greatest) and confirmed his sobriety . Client participated in group Personal Recovery Care Plan packet and discussed values. Client shared with group values that are important to him is \"integrity\".  Client shared with group his work stress is decreasing  as he gets more help from co workers. Client report thoughts about how group was helpful for him this week as \"I thought about the values that I violated in the past.\"      Intervention:   Aftercare planning  Counselor feedback  Emotional management  Group feedback  Relapse prevention   Client worked on Recovery Care Packet with the group.     Assessment:   Stages of Change Model  Preparation/Determination    Appears/Sounds:  Cooperative  Motivated  Engaged     Client appears to see how sobriety has improved his mental and physical health. Client appears to want to make these changes for himself, as he is continuing with individual " and marriage counseling.     Plan:  Focus on recovery environment  Monitor emotional/physical health   Continue to monitor anxiety/stress and be mindful of thoughts and find ways to use alternative thinking to improve emotion.   Continue with individual therapist.  Continue counseling with wife.  Monitor self care with returning to work  Work on RCP for next week.    Oumou Ashraf, ALEJANDRO Nickerson, Intern

## 2017-04-11 ENCOUNTER — HOSPITAL ENCOUNTER (OUTPATIENT)
Dept: BEHAVIORAL HEALTH | Facility: CLINIC | Age: 37
End: 2017-04-11
Attending: SOCIAL WORKER
Payer: COMMERCIAL
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
## 2017-04-18 ENCOUNTER — HOSPITAL ENCOUNTER (OUTPATIENT)
Dept: BEHAVIORAL HEALTH | Facility: CLINIC | Age: 37
End: 2017-04-18
Attending: SOCIAL WORKER
Payer: COMMERCIAL

## 2017-04-18 PROCEDURE — H2035 A/D TX PROGRAM, PER HOUR: HCPCS | Mod: HQ

## 2017-04-19 NOTE — PROGRESS NOTES
CHEMICAL DEPENDENCY DISCHARGE SUMMARY      EVALUATION COUNSELOR:  ALEJANDRO Landon.   TREATMENT COUNSELOR:  ALEJANDRO Busch.   REFERRAL SOURCE:  Legal/DWI.   PROGRAM:  [facility] Intensive Outpatient Chemical Dependency Program in [place].     ADMISSION DATE:  12/14/2016.     LAST SESSION DATE:  04/18/2017.     DISCHARGE DATE:  04/18/2017.     ADMISSION IMPRESSION:     1.  303.90/F10.20, alcohol use disorder, moderate.   2.  305.20/F12.10, cannabis use disorder, mild.     DISCHARGE IMPRESSION:     1.  303.90/F10.20, alcohol use disorder, moderate, in early remission.   2.  305.20/F12.10, cannabis use disorder, mild.     REASON FOR DISCHARGE:  Brooks Dhaliwal successfully completed the program.     LAST USE DATE:  Client reports last date of use of alcohol as 10/20/2016 and for marijuana 12/10/2016.     HOURS OF TREATMENT COMPLETED:  54 hours      REASON FOR EVALUATION:  Isaac Dhaliwal had a CD evaluation with ALEJANDRO Torrez, on 12/05/2016.  Client reported he had an assessment following a third DWI.      SERVICES PROVIDED:  Included treatment and discharge planning, psychoeducation, recovery skill building, relapse prevention skills, problem solving, managing conflicts, clarifying values, expressing feelings, emotional management, social skill building, 12-step facilitation and group therapy.      ISSUES ADDRESSED IN TREATMENT:      DIMENSION 1:  Acute Withdrawal Issues and Detox:  Admit risk rating 0, discharge risk rating 0.  The client reports last use date of alcohol as 10/20/2016 and marijuana as 12/10/2016.  Client denied any use episodes while in treatment.  Client did not show signs of intoxication or withdrawal.  Client worked on developing effective coping strategies to maintain sobriety.  No other concerns in this dimension at time of discharge.      DIMENSION 2:  Biomedical Conditions and/or Complications:  Admit risk rating 0, discharge risk rating 0.  The client  "reports a biomedical diagnosis of seasonal allergies and acid reflux.  Client's goal is to follow all recommendations of provider and to follow up with medical interventions while in treatment.  Client has a primary care provider and is able to obtain services as needed.  Client reports being prescribed medications for medical conditions and takes his medications as prescribed.  Client read \"Beyond Hangovers\" and discussed with the group how alcohol use can cause liver issues, brain dysfunctions and also heart complications.  No other concerns in this dimension at time of discharge.      DIMENSION 3:  Emotional and Behavioral:  Admit risk rating 1.  Discharge risk rating 1.  Client reports mental health diagnosis of anxiety, depression and ADHD.  Client's goal is to gain understanding in the relationship between addiction and mental health issues.  Client listed 5 ways that addiction has impacted his mental health.  Client completed \"My Anxiety Profile\" assignment with the group.  Client was able to identify his symptoms of anxiety as increased heart rate, shortness of breath and nausea.  Client reported he currently has a psychiatrist with [facility].  Client reports he has a mental health therapist with a private provider.  Client reports he takes medication for mental health symptoms and takes it as prescribed.  Client arrived to group daily with a positive affirmation.  Client denies any current or past SI or SIB.  Client denies any past suicide attempts.  Client denied thoughts of self-harm while in treatment.  No other concerns in this dimension at time of discharge.      DIMENSION 4:  Readiness for Change:  Admit risk rating 1.  Discharge risk rating 1.  Client was in treatment due to his third DWI.  While in treatment client gained awareness of consequences his use has had on himself and his personal relationships.  Client gained internal motivation for sobriety each week by identifying 3 reasons to " "remain sober while in treatment.  Client presented his usage history to the group and the legal consequences of his use.  Client reported he lied before about it and has a history of multiple drug use.  Client reported how his usage increased over the last year and that he would like to stop using substances.  Client reported no one expressed concern about his use.  Client participated in spiritual care group while in treatment.  No other concerns in this dimension at time of discharge.      DIMENSION 5:  Relapse and Continued Problem Potential:  Admit risk rating 3, discharge risk rating 2.  Client reported feeling uneasy about his ability to remain sober.  Client's goal is to learn how to stay sober.  Client completed his cravings and triggers assignment.  Client identified personal triggers and relapse warning signs.  While in treatment client developed a daily routine of healthy sober and coping living skills and listed alternative coping skills such as deep breathing, being more assertive and using I statements, also delegating tasks at work.  Client reported a trigger as pressure and anxiety.  Client completed \"What is Happening in My Early Recovery\" assignment and presented it to the group.  Client reported changes such as weight loss, frustration, fear of relapse, hopelessness and feeling more connected as things he noticed while in treatment.  No other concerns in this dimension at time of discharge.      DIMENSION 6:  Recovery Environment:  Admit risk rating 2, discharge risk rating 1.  Client's goal was to develop sober leisure activities and develop a sober support network.  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  Client completed his recovery care with his Phase II group.  Client completed the assignment of listing 10 situations, people and emotional responses that are unhealthy and developed a plan to avoid or manage them.  Client listed situations such as withdrawing from " others, complacency deadlines, bored with winter, aborting bar friends and avoiding lying, avoiding problems as unhealthy.  Client has past and current legal with 3 DWIs, the most current one from 10/2016.  Client has Interlock placed in his vehicle at time of discharge.  Client was recommended to comply with requirements of probation and the court which he did throughout treatment.  No other concerns in this dimension at time of discharge.      PROGNOSIS:  This client has a favorable prognosis, assuming he follows all continuation of care recommendations.      STRENGTHS:  Client is employed and worked on developing a supportive sober network.  Client worked on positive self-talk, feelings of self-worth, acceptance of past mistakes and communication with significant others.  Client has made lifestyle change to help him continue long-term sobriety.      LIVING ARRANGEMENTS AT DISCHARGE:  Client lives with his wife in a house they own.      CONTINUATION OF CARE RECOMMENDATIONS:    Primitivo is to abstain from all non-prescribed mood-altering substances.    Primitivo is recommended to continue managing and monitoring his physical health with his healthcare providers as directed to do so.    Primitivo is recommended to continue therapy (individual and couples) as recommended with his mental health therapist.  Primitivo is recommended to attend regularly and weekly sober support meetings.    Primitivo is recommended to follow all conditions of probation.    Primitivo is recommended to follow all conditions of Interlock.         This information has been disclosed to you from records protected by Federal confidentiality rules (42 CFR part 2). The Federal rules prohibit you from making any further disclosure of this information unless further disclosure is expressly permitted by the written consent of the person to whom it pertains or as otherwise permitted by 42 CFR part 2. A general authorization for the release of medical or other information is NOT  sufficient for this purpose. The Federal rules restrict any use of the information to criminally investigate or prosecute any alcohol or drug abuse patient.      TG DEVRIES, [facility]             D: 2017 21:58   T: 2017 06:22   MT: SK      Name:     DALY HERNANDEZ   MRN:      [id number]        Account:      [id number]   :      1980           Visit Date:   2017      Document: [id number]

## 2017-04-19 NOTE — PROGRESS NOTES
[facility]     Patient: Brooks Dhaliwal  MRN: [id number]   : 1980 Age: 36 year old Sex: male   -  Focus of Treatment / Discharge Recommendations    Personal Safety/ Management of Symptoms    * Follow your safety plan.  Report increased symptoms to your care team  and /or go to the nearest Emergency Department.    * Call crisis lines as needed    [facility] [phone]                [facility] [phone]  [facility] [phone]              [facility] [phone]  [facility] [phone]              [facility] [place] [facility] [phone]  [facility] [phone]          National Suicide Prevention [phone]  [facility] [phone]            Suicide Prevention [phone]  [facility] [phone]    Abstinence/Relapse Prevention  * Take all medicines as directed.  Carry a current list of medicines with you.  * Use coping skills: taking time to breathe, deep breathing, thinking through actions, staying busy with productive tasks   * Do not use illicit (street) drugs, controlled substances (narcotics) or alcohol.    Develop/Improve Independent Living/Socialization Skills:   Continue to grow sober support network    Community Resources/Supports and Discharge Planning:    Follow up with psychiatrist / main caregiver: Dr. Blu Castillo    Next visit: TBD    Follow up with your therapist: Sally Lan, therapist   Next visit: 2017    Go to group therapy and / or support groups at: TBD    See your medical doctor about: as needed    Other:  Comply with conditions of [facility] [place] Probation   Comply with conditions of [facility]     Client Signature:_______________________   Date / Time:___________  Staff Signature:________________________   Date / Time:___________

## 2017-04-19 NOTE — PROGRESS NOTES
"Patient:  Brooks Dhaliwal            Adult CD Progress Note and Treatment Plan Review     Attendance Dates: 04/18/2017    Total # of Phase 2 Group Sessions:  10  Total # of Group sessions this week: 1        MONDAY TUESDAY WEDNESDAY THURSDAY FRIDAY SATURDAY SUNDAY  Total    Group Therapy                    Specialty Groups*                   1:1                    Family Program                    [place]                      Phase II      1.5            1.5   Absent                    Total     1            1.5             Please refer to [facility] CD Adult Attendance Record Documentation Flowsheet    Support group attended this week: no    Reporting sobriety:  yes    Treatment Plan     Treatment Plan Review competed on: 04/19/2017      Client preferred learning style: Visual  Hands on    Staff Members contributing JUSTINO BuschC;  ELIZA Nickerson, Intern   Received Supervision: No    Client: contributed to goals and plan.    Client received copy of plan/revised plan: Yes    Client agrees with plan/revised plan: Yes    Changes to Treatment Plan: No    New Goals added since last review: N/A    Goals worked on since last review:  Completing [place]  Strategies effective: yes    Strategies need these changes: N/A    ASAM Risk Rating:    Dimension 1 0 Client reports last date of use of alcohol as 10/20/2016 and last date of use of marijuana as 12/10/2016. Client reports no withdrawal symptoms at this time. No change.        Dimension 2 0 Client reports no current medical diagnosis. Client reports he takes Prilosec and Adderall.  Client reports he takes them as prescribed. Client is able to obtain medical services as needed. No current concerns.       Dimension 3 1 Client reports no change in mental health status or diagnosis.  Client reports feelings of anxiety rated at 6 of 10 ( 10 highest intensity ).  Client reports he is able to cope with feelings by  \"deep breathing.\"  Client denies self harm.  He appears " "stable.      Dimension 4 0 Client identified values he focused on for moving his life forward in the past week was integrity.  Client continues to appear in the preparation stage of change, by addressing his mental health and reasons for sobriety. Client has forced sobriety with interlock for 1 year.       Dimension 5 2  Client reported experiencing cravings this week as 3 of 10 (10 used). Client states his triggers of work stress.   Client reports he is dealing with cravings and triggers by \"deep breathing, letting things go and continuing to be open to support my sobriety.\"       Dimension 6 2  Client rated his living situation as very supportive.  Client reports his \"wife is very supportive\". Client reports he will build sober networks in the coming week by \"continuing being open and honest with myself and with people.\"  Client denies sober support meetings. Client denies a sponsor.     Any changes in Vulnerable Adult Status?  No  If yes, add to treatment plan and individual abuse prevention plan.    Family Involvement: Unable to attend      Data:   offered feedback client did actively participate     Client checked in with group feeling a 8 out of 10 (10 greatest) and confirmed his sobriety . Client participated in group discussion about coping with triggers during the spring and summer season. Client states that during the  summer months, he will continue to be open and honest with his friends about his sobriety. Client shared that his sobriety feels like a fresh start in all areas of his life, and he notice everything slowly getting on track. Client states that he noticed his mental health is better and more stable, and his confidence in his sobriety is building with each passing day. Client completed presented his RCP and shared with group. Client completed Phase 2.      Intervention:   Aftercare planning  Counselor feedback  Emotional management  Group feedback  Relapse prevention   Client presented his " final assignments.     Assessment:   Stages of Change Model  Preparation/Determination    Appears/Sounds:  Cooperative  Motivated  Engaged     Client appears to see how sobriety has improved his mental and physical health. Client appears to want to make these changes for himself, as he is continuing with individual and marriage counseling.     Plan:  Focus on recovery environment  Monitor emotional/physical health   Continue to monitor anxiety/stress and be mindful of thoughts and find ways to use alternative thinking to improve emotions.   Continue with individual therapist.  Continue counseling with wife.  Monitor self care with returning to work.  Follow conditions of probation and interlock.       ALEJANDRO Busch, Intern